# Patient Record
Sex: FEMALE | Race: WHITE | NOT HISPANIC OR LATINO | URBAN - METROPOLITAN AREA
[De-identification: names, ages, dates, MRNs, and addresses within clinical notes are randomized per-mention and may not be internally consistent; named-entity substitution may affect disease eponyms.]

---

## 2022-11-04 PROBLEM — I10 HYPERTENSION: Status: ACTIVE | Noted: 2022-11-04

## 2023-04-05 PROBLEM — E78.00 HYPERCHOLESTEREMIA: Status: ACTIVE | Noted: 2023-04-05

## 2023-04-07 PROBLEM — I87.2 VENOUS INSUFFICIENCY OF BOTH LOWER EXTREMITIES: Status: ACTIVE | Noted: 2023-04-07

## 2024-04-15 ENCOUNTER — TELEPHONE (OUTPATIENT)
Dept: CARDIOLOGY | Facility: MEDICAL CENTER | Age: 89
End: 2024-04-15

## 2024-04-15 NOTE — TELEPHONE ENCOUNTER
.  DALE Germain.  You7 minutes ago (4:22 PM)     Yes, please refill     I spoke to pt's daughter Chantell and told her JG's message. No need for refill at this time. Pt have enough supply.

## 2024-04-15 NOTE — TELEPHONE ENCOUNTER
ROBYN    Caller: Chantell (Patient's daughter)     Topic/issue: Daughter Chantell calling because her mother sees Meli in [place]. She was advised by Meli to take her mom off potassium and then get a lab draw to see what next steps will be. Chantell is requesting a call back to discuss those lab results and find out if her mom should be put back on the potassium. Please advise.     Callback Number: [phone]    Thank you,   Nilsa STEELE

## 2024-04-15 NOTE — TELEPHONE ENCOUNTER
To ROBYN: Please advise. Pt was taken off potassium 2 weeks ago. Pt had her labs done on 04/08/24 potassium came back 4.1. pt is still taking lasix 20mg daily. Pt's daughter wants to know if pt should restart potassium back. Thank you.